# Patient Record
Sex: MALE | Race: OTHER | Employment: FULL TIME | URBAN - METROPOLITAN AREA
[De-identification: names, ages, dates, MRNs, and addresses within clinical notes are randomized per-mention and may not be internally consistent; named-entity substitution may affect disease eponyms.]

---

## 2021-11-09 ENCOUNTER — HOSPITAL ENCOUNTER (OUTPATIENT)
Age: 36
Discharge: HOME | End: 2021-11-09
Payer: COMMERCIAL

## 2021-11-09 VITALS
DIASTOLIC BLOOD PRESSURE: 89 MMHG | SYSTOLIC BLOOD PRESSURE: 158 MMHG | BODY MASS INDEX: 36.96 KG/M2 | WEIGHT: 230 LBS | HEIGHT: 66 IN | RESPIRATION RATE: 18 BRPM | OXYGEN SATURATION: 98 % | HEART RATE: 100 BPM | TEMPERATURE: 99 F

## 2021-11-09 DIAGNOSIS — Z20.822 ENCOUNTER FOR LABORATORY TESTING FOR COVID-19 VIRUS: Primary | ICD-10-CM

## 2021-11-09 PROCEDURE — 99202 OFFICE O/P NEW SF 15 MIN: CPT | Performed by: NURSE PRACTITIONER

## 2021-11-09 PROCEDURE — U0002 COVID-19 LAB TEST NON-CDC: HCPCS | Performed by: NURSE PRACTITIONER

## 2021-11-09 NOTE — ED PROVIDER NOTES
Patient Seen in: [facility]      History   Patient presents with:  Covid-19 Test    Stated Complaint: COVID test    Subjective:   Patient presents to the immediate care, accompanied by self.   Patient reports he is here for Covid test, he had General: He is not in acute distress. Appearance: Normal appearance. He is obese. He is not ill-appearing, toxic-appearing or diaphoretic. HENT:      Head: Normocephalic.       Right Ear: Tympanic membrane and ear canal normal.      Left Ear: Tympanic he is here for Covid test, he had a coworker who tested positive for Covid today. Patient states he was with his coworker on Friday. Patient states he has no symptoms and is fully vaccinated with the Covid vaccine.   Patient is awake, alert, no acute dist

## 2023-08-09 ENCOUNTER — APPOINTMENT (OUTPATIENT)
Dept: GENERAL RADIOLOGY | Age: 38
End: 2023-08-09
Attending: NURSE PRACTITIONER
Payer: COMMERCIAL

## 2023-08-09 ENCOUNTER — HOSPITAL ENCOUNTER (OUTPATIENT)
Age: 38
Discharge: HOME | End: 2023-08-09
Payer: COMMERCIAL

## 2023-08-09 VITALS
BODY MASS INDEX: 36.96 KG/M2 | HEIGHT: 66 IN | SYSTOLIC BLOOD PRESSURE: 146 MMHG | TEMPERATURE: 99 F | DIASTOLIC BLOOD PRESSURE: 96 MMHG | RESPIRATION RATE: 20 BRPM | OXYGEN SATURATION: 98 % | HEART RATE: 100 BPM | WEIGHT: 230 LBS

## 2023-08-09 DIAGNOSIS — M62.838 CERVICAL PARASPINAL MUSCLE SPASM: ICD-10-CM

## 2023-08-09 DIAGNOSIS — R03.0 ELEVATED BLOOD PRESSURE READING: ICD-10-CM

## 2023-08-09 DIAGNOSIS — M54.2 NECK PAIN: Primary | ICD-10-CM

## 2023-08-09 PROCEDURE — 99213 OFFICE O/P EST LOW 20 MIN: CPT | Performed by: NURSE PRACTITIONER

## 2023-08-09 PROCEDURE — 72040 X-RAY EXAM NECK SPINE 2-3 VW: CPT | Performed by: NURSE PRACTITIONER

## 2023-08-09 RX ORDER — CYCLOBENZAPRINE HCL 10 MG
10 TABLET ORAL NIGHTLY
Qty: 5 TABLET | Refills: 0 | Status: SHIPPED | OUTPATIENT
Start: 2023-08-09 | End: 2023-08-14

## 2023-08-09 NOTE — ED INITIAL ASSESSMENT (HPI)
Pt complaining of headache and neck pain started Sunday after walking up the stairs with 3 garbage cans. +dizziness. Pt states sinus pressure for the last week, pt has been taking sudafed, allergy pill, and flonase.

## 2023-08-09 NOTE — DISCHARGE INSTRUCTIONS
Flexeril 1 tablet nightly for the next 5 days  Tylenol 1000 mg every 6 hours as needed  Heating pad to area 10-15 minutes at a time a few times per day  For headache, dizziness recurrence, weakness/numbness/tingling, please go to ER    Your blood pressure is high today. Please check your blood pressure at home 3 times per day, and write it down. Do this for 5 days. Follow up with your doctor in 5 days, and bring the blood pressure log with you for review. For now, avoid high sodium foods. Avoid decongestants like Afrin nasal spray or Sudafed. Blood pressure over 180/100 is concerning, if you have 2 consecutive readings like this, please call your primary care provider or go directly to the emergency room. If you develop chest pain, shortness of breath, dizziness, headache or other unusual symptoms, please go to the emergency room.      Dr. Gualberto Bonilla is pcp in the [facility]

## 2023-08-28 ENCOUNTER — APPOINTMENT (OUTPATIENT)
Dept: URBAN - METROPOLITAN AREA CLINIC 244 | Age: 38
Setting detail: DERMATOLOGY
End: 2023-08-28

## 2023-08-28 DIAGNOSIS — L72.8 OTHER FOLLICULAR CYSTS OF THE SKIN AND SUBCUTANEOUS TISSUE: ICD-10-CM

## 2023-08-28 PROBLEM — D48.5 NEOPLASM OF UNCERTAIN BEHAVIOR OF SKIN: Status: ACTIVE | Noted: 2023-08-28

## 2023-08-28 PROCEDURE — OTHER COUNSELING: OTHER

## 2023-08-28 PROCEDURE — OTHER DEFER: OTHER

## 2023-08-28 PROCEDURE — 99203 OFFICE O/P NEW LOW 30 MIN: CPT

## 2023-08-28 ASSESSMENT — LOCATION ZONE DERM: LOCATION ZONE: NECK

## 2023-08-28 ASSESSMENT — LOCATION SIMPLE DESCRIPTION DERM: LOCATION SIMPLE: POSTERIOR NECK

## 2023-08-28 ASSESSMENT — LOCATION DETAILED DESCRIPTION DERM: LOCATION DETAILED: RIGHT POSTERIOR NECK

## 2023-08-28 NOTE — PROCEDURE: DEFER
Introduction Text (Please End With A Colon): The following procedure was deferred:
Detail Level: Detailed
X Size Of Lesion In Cm (Optional): 0
Procedure To Be Performed At Next Visit: Excision
Instructions (Optional): 20 min appt + 10 min for prep

## 2024-02-28 ENCOUNTER — OFFICE VISIT (OUTPATIENT)
Dept: FAMILY MEDICINE CLINIC | Facility: CLINIC | Age: 39
End: 2024-02-28

## 2024-02-28 VITALS
DIASTOLIC BLOOD PRESSURE: 96 MMHG | WEIGHT: 257.38 LBS | BODY MASS INDEX: 41.37 KG/M2 | HEIGHT: 66 IN | SYSTOLIC BLOOD PRESSURE: 149 MMHG | HEART RATE: 102 BPM

## 2024-02-28 DIAGNOSIS — R03.0 ELEVATED BLOOD PRESSURE READING IN OFFICE WITHOUT DIAGNOSIS OF HYPERTENSION: ICD-10-CM

## 2024-02-28 DIAGNOSIS — M79.672 BILATERAL FOOT PAIN: ICD-10-CM

## 2024-02-28 DIAGNOSIS — I83.90 VARICOSE VEINS: ICD-10-CM

## 2024-02-28 DIAGNOSIS — M79.671 BILATERAL FOOT PAIN: ICD-10-CM

## 2024-02-28 DIAGNOSIS — R06.83 SNORING: ICD-10-CM

## 2024-02-28 DIAGNOSIS — I86.1 VARICOCELE: ICD-10-CM

## 2024-02-28 DIAGNOSIS — Z00.00 WELL ADULT EXAM: Primary | ICD-10-CM

## 2024-02-28 DIAGNOSIS — R53.83 OTHER FATIGUE: ICD-10-CM

## 2024-02-28 DIAGNOSIS — J30.9 ALLERGIC RHINITIS, UNSPECIFIED SEASONALITY, UNSPECIFIED TRIGGER: ICD-10-CM

## 2024-02-28 DIAGNOSIS — E66.01 MORBID OBESITY WITH BMI OF 40.0-44.9, ADULT (HCC): ICD-10-CM

## 2024-02-28 DIAGNOSIS — R04.0 EPISTAXIS: ICD-10-CM

## 2024-02-29 NOTE — PROGRESS NOTES
HPI  Pt here to establish care.   Has some general fatigue.   Has some issues w sinus problems-has not been able to taste or smell since Thanksgiving. Does not think he had covid as he never tested positive  Has long h/o allergy symptoms  Having some pain in right testicle-pulsating pain. 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    Has h/o fracture foot, herniated disc    Family medical history-diabetes; mgf-quadruple bypass, uncle-4 strokes, gm-dm    Has bilat foot pain and swelling-went to podiatry last year-got custom insoles-never fit right.   Has swelling in legs-wears compression hose.     Bp runs 140s/90s at home.   Review of Systems   Constitutional:  Positive for activity change and fatigue.   HENT:  Positive for congestion, nosebleeds, sinus pressure and sinus pain.         Can't smell or taste food since November.    Cardiovascular:  Positive for leg swelling.   Genitourinary:  Positive for testicular pain (right post tender swiggly mass).   Musculoskeletal:  Positive for arthralgias (bialt foot pain R>L) and gait problem.   Psychiatric/Behavioral:  Positive for sleep disturbance (snores alot-always feels tired).        Vitals:    02/28/24 1843 02/28/24 1928   BP: (!) 142/99 (!) 149/96   Pulse: 102    Weight: 257 lb 6.4 oz (116.8 kg)    Height: 5' 6\" (1.676 m)      Body mass index is 41.55 kg/m².  Wt Readings from Last 6 Encounters:   02/28/24 257 lb 6.4 oz (116.8 kg)   08/09/23 230 lb (104.3 kg)   11/09/21 230 lb (104.3 kg)         Health Maintenance   Topic Date Due    Annual Physical  Never done    Pneumococcal Vaccine: Birth to 66yrs (1 of 2 - PCV) Never done    Tobacco Cessation Counseling 1 Year  Never done    DTaP,Tdap,and Td Vaccines (1 - Tdap) Never done    COVID-19 Vaccine (1 - 2023-24 season) Never done     Influenza Vaccine (1) Never done    Annual Depression Screening  Never done       Past Medical History:   Diagnosis Date    Sleep apnea        .No past surgical history on file.    Family History   Problem Relation Age of Onset    Diabetes Mother     Hypertension Mother        Social History     Socioeconomic History    Marital status:      Spouse name: Not on file    Number of children: Not on file    Years of education: Not on file    Highest education level: Not on file   Occupational History    Not on file   Tobacco Use    Smoking status: Every Day     Types: Cigarettes, Cigars    Smokeless tobacco: Not on file   Vaping Use    Vaping Use: Never used   Substance and Sexual Activity    Alcohol use: Yes     Alcohol/week: 1.0 standard drink of alcohol     Types: 1 Cans of beer per week    Drug use: Never    Sexual activity: Not on file   Other Topics Concern    Not on file   Social History Narrative    Not on file     Social Determinants of Health     Financial Resource Strain: Not on file   Food Insecurity: Not on file   Transportation Needs: Not on file   Physical Activity: Not on file   Stress: Not on file   Social Connections: Not on file   Housing Stability: Not on file       No current outpatient medications on file.       Allergies:  No Known Allergies    Physical Exam  Vitals and nursing note reviewed.   Constitutional:       General: He is not in acute distress.     Appearance: He is obese.   HENT:      Head: Normocephalic.      Right Ear: Tympanic membrane, ear canal and external ear normal.      Left Ear: Tympanic membrane, ear canal and external ear normal.      Nose: Congestion and rhinorrhea present.      Right Nostril: Epistaxis present.      Left Nostril: Epistaxis present.      Mouth/Throat:      Mouth: Mucous membranes are moist.      Pharynx: Oropharyngeal exudate present. No posterior oropharyngeal erythema.   Eyes:      Conjunctiva/sclera: Conjunctivae normal.   Cardiovascular:      Rate  and Rhythm: Normal rate and regular rhythm.      Heart sounds: Normal heart sounds.   Pulmonary:      Effort: No respiratory distress.      Breath sounds: Normal breath sounds.   Abdominal:      General: Bowel sounds are normal. There is no distension.      Palpations: Abdomen is soft.   Genitourinary:     Comments: Post tortuous mass noted to right upper post testes  Musculoskeletal:         General: Tenderness (mild int rotation of bilat feet) present.      Cervical back: Normal range of motion and neck supple.   Lymphadenopathy:      Cervical: No cervical adenopathy.   Skin:     General: Skin is dry.   Neurological:      Mental Status: He is alert and oriented to person, place, and time.   Psychiatric:         Mood and Affect: Mood normal.         Behavior: Behavior normal.         Assessment and Plan:   Problem List Items Addressed This Visit       Allergic rhinitis     -otc non-drowsy antihistamine (generic claritin, zyrtec or allegra)  -add steroidal nasal spray (flonase, rhinocort -generic works well)    -supportive care discussed  -Please call if symptoms worsen or are not resolving.   Refer ENT         Relevant Orders    ENT - INTERNAL    Bilateral foot pain     Ear good supportive shoes  Encourage weight loss  Refer podiatry         Relevant Orders    PODIATRY - INTERNAL    Epistaxis     Refer ENT  Treat allergy symptoms as discussed  Use of humidifier at bedside  Neti pot, nasal saline         Relevant Orders    ENT - INTERNAL    Morbid obesity with BMI of 40.0-44.9, adult (HCC)     I recommend that you try/continue to decrease the amount of carbohydrates that you ingest (bread products, rice, pasta, potatoes, starchy vegetables and sugary beverages). Also try to increase the amount of vegetables and protein that you eat daily.  Decrease the amount of processed and fast foods. Try to exercise at least 30 minutes per day, 4-5 times per week, combination of cardio and weight training.            Other  fatigue     Check blood work  Refer sleep study         Relevant Orders    Home Sleep Apnea Test (Adult pt only) - Sleep consult required for Medicare pts    General sleep study    Snoring     Sleep study  Encourage weight loss         Relevant Orders    Home Sleep Apnea Test (Adult pt only) - Sleep consult required for Medicare pts    General sleep study    Varicocele     Refer urology         Relevant Orders    UROLOGY - INTERNAL    Varicose veins     Ultrasound venous insufficiency bilat  Will refer to vascular sx if  needed         Relevant Orders    US VENOUS INSUFFICIENCY (REFLUX) BILAT LOWER EXT SH(CPT=93970)    Well adult exam - Primary     Please aim to eat a diet high in fresh fruits and vegetables, lean protein sources, complex carbohydrates and limited processed and fast foods.  Try to get at least 150 minutes of exercise per week-a combination of weight resistance and cardio is preferred.    Declines vaccines  Screening labs ordered           Relevant Orders    CBC, Platelet; No Differential    Comp Metabolic Panel (14)    Hemoglobin A1C    Lipid Panel    TSH W Reflex To Free T4    Vitamin B12    Vitamin D, 25-Hydroxy               Discussed plan of care with pt and pt is in agreement.All questions answered. Pt to call with questions or concerns.      Encouraged to sign up for My Chart if not already registered.

## 2024-03-04 PROBLEM — R03.0 ELEVATED BLOOD PRESSURE READING IN OFFICE WITHOUT DIAGNOSIS OF HYPERTENSION: Status: ACTIVE | Noted: 2024-03-04

## 2024-03-05 NOTE — ASSESSMENT & PLAN NOTE
Check blood pressure twice per day with arm cuff bp monitor for 2 weeks  Record date, time, blood pressure and pulse reading  Send in copy of bp log to me via bettermarks or you may call into nurse triage staff  Call if bp consistently > 140/86  Go to ER if any severe headache, chest pain, shortness of breath, visual changes  Please let me know if you have any questions or concerns.

## 2024-03-05 NOTE — ASSESSMENT & PLAN NOTE
-otc non-drowsy antihistamine (generic claritin, zyrtec or allegra)  -add steroidal nasal spray (flonase, rhinocort -generic works well)    -supportive care discussed  -Please call if symptoms worsen or are not resolving.   Refer ENT

## 2024-03-05 NOTE — ASSESSMENT & PLAN NOTE
Refer ENT  Treat allergy symptoms as discussed  Use of humidifier at bedside  Neti pot, nasal saline

## 2024-03-05 NOTE — PATIENT INSTRUCTIONS
ALLERGIC RHINITIS    -Take otc allergy medications as directed (over the counter, generic Claritin, Zyrtec, Xyzal or Allegra)    -use of steroidal nasal spray as advised (Flonase, Rhinocort)-this is now available as a generic, over the counter spray if your insurance doesn't cover it     used every morning faithfully each morning during the allergy season is the BEST treatment; they are very safe for use over a period of 6-7 months (April - October)    -over the counter allergy eye drops-Zaditor (ketotifen) 1 drop twice a day works very well for eye symptoms    -keep windows closed at night    -do not allow pets to sleep in room    -use allergen covers on pillows, mattress and box spring to reduce exposure to dust mites    -use of Neti pot    -shower after outdoor activities, especially when allergy counts are high    -Use of allergen filters for furnace; consider air purifier if allergies are significant at night    -Call  If symptoms do not improve, worsen or with other questions or concerns

## 2024-03-05 NOTE — ASSESSMENT & PLAN NOTE
Please aim to eat a diet high in fresh fruits and vegetables, lean protein sources, complex carbohydrates and limited processed and fast foods.  Try to get at least 150 minutes of exercise per week-a combination of weight resistance and cardio is preferred.    Declines vaccines  Screening labs ordered

## 2024-03-05 NOTE — ASSESSMENT & PLAN NOTE
I recommend that you try/continue to decrease the amount of carbohydrates that you ingest (bread products, rice, pasta, potatoes, starchy vegetables and sugary beverages). Also try to increase the amount of vegetables and protein that you eat daily.  Decrease the amount of processed and fast foods. Try to exercise at least 30 minutes per day, 4-5 times per week, combination of cardio and weight training.

## 2024-03-19 DIAGNOSIS — E55.9 VITAMIN D DEFICIENCY: Primary | ICD-10-CM

## 2024-03-19 LAB
ALBUMIN/GLOBULIN RATIO: 1.3 (CALC) (ref 1–2.5)
ALBUMIN: 4.1 G/DL (ref 3.6–5.1)
ALKALINE PHOSPHATASE: 64 U/L (ref 36–130)
ALT: 32 U/L (ref 9–46)
AST: 29 U/L (ref 10–40)
BILIRUBIN, TOTAL: 0.4 MG/DL (ref 0.2–1.2)
BUN: 12 MG/DL (ref 7–25)
CALCIUM: 8.9 MG/DL (ref 8.6–10.3)
CARBON DIOXIDE: 27 MMOL/L (ref 20–32)
CHLORIDE: 104 MMOL/L (ref 98–110)
CHOL/HDLC RATIO: 3.3 (CALC)
CHOLESTEROL, TOTAL: 134 MG/DL
CREATININE: 0.77 MG/DL (ref 0.6–1.26)
EGFR: 118 ML/MIN/1.73M2
GLOBULIN: 3.2 G/DL (CALC) (ref 1.9–3.7)
GLUCOSE: 114 MG/DL (ref 65–99)
HDL CHOLESTEROL: 41 MG/DL
HEMATOCRIT: 44.5 % (ref 38.5–50)
HEMOGLOBIN A1C: 5.4 % OF TOTAL HGB
HEMOGLOBIN: 14.8 G/DL (ref 13.2–17.1)
LDL-CHOLESTEROL: 74 MG/DL (CALC)
MCH: 30.9 PG (ref 27–33)
MCHC: 33.3 G/DL (ref 32–36)
MCV: 92.9 FL (ref 80–100)
MPV: 11.6 FL (ref 7.5–12.5)
NON-HDL CHOLESTEROL: 93 MG/DL (CALC)
PLATELET COUNT: 229 THOUSAND/UL (ref 140–400)
POTASSIUM: 4.2 MMOL/L (ref 3.5–5.3)
PROTEIN, TOTAL: 7.3 G/DL (ref 6.1–8.1)
RDW: 12.5 % (ref 11–15)
RED BLOOD CELL COUNT: 4.79 MILLION/UL (ref 4.2–5.8)
SODIUM: 140 MMOL/L (ref 135–146)
TRIGLYCERIDES: 106 MG/DL
TSH W/REFLEX TO FT4: 1.02 MIU/L (ref 0.4–4.5)
VITAMIN B12: 469 PG/ML (ref 200–1100)
VITAMIN D, 25-OH, TOTAL: 21 NG/ML (ref 30–100)
WHITE BLOOD CELL COUNT: 7 THOUSAND/UL (ref 3.8–10.8)

## 2024-03-19 RX ORDER — CHOLECALCIFEROL (VITAMIN D3) 1250 MCG
50000 CAPSULE ORAL WEEKLY
Qty: 12 CAPSULE | Refills: 3 | Status: SHIPPED | OUTPATIENT
Start: 2024-03-19

## 2024-03-27 ENCOUNTER — PATIENT MESSAGE (OUTPATIENT)
Dept: FAMILY MEDICINE CLINIC | Facility: CLINIC | Age: 39
End: 2024-03-27

## 2024-03-28 NOTE — TELEPHONE ENCOUNTER
_______________________________________________________  Referred to Provider Information:  Provider Address Phone   Brielle Tristan MD [address]  [facility] [address] [phone]       Future Appointments   Date Time Provider Department Center   4/16/2024  3:00 PM [facility] SLEEP ROOMS [facility]EP [facility]

## 2024-04-12 ENCOUNTER — OFFICE VISIT (OUTPATIENT)
Dept: SURGERY | Facility: CLINIC | Age: 39
End: 2024-04-12

## 2024-04-12 DIAGNOSIS — R82.90 URINE FINDING: ICD-10-CM

## 2024-04-12 DIAGNOSIS — N50.811 RIGHT TESTICULAR PAIN: Primary | ICD-10-CM

## 2024-04-12 LAB
APPEARANCE: CLEAR
BILIRUBIN: NEGATIVE
GLUCOSE (URINE DIPSTICK): NEGATIVE MG/DL
LEUKOCYTES: NEGATIVE
MULTISTIX LOT#: NORMAL NUMERIC
NITRITE, URINE: NEGATIVE
OCCULT BLOOD: NEGATIVE
PH, URINE: 5.5 (ref 4.5–8)
PROTEIN (URINE DIPSTICK): NEGATIVE MG/DL
SPECIFIC GRAVITY: 1.02 (ref 1–1.03)
URINE-COLOR: YELLOW
UROBILINOGEN,SEMI-QN: 0.2 MG/DL (ref 0–1.9)

## 2024-04-12 PROCEDURE — 81002 URINALYSIS NONAUTO W/O SCOPE: CPT | Performed by: PHYSICIAN ASSISTANT

## 2024-04-12 PROCEDURE — 99203 OFFICE O/P NEW LOW 30 MIN: CPT | Performed by: PHYSICIAN ASSISTANT

## 2024-04-12 NOTE — PROGRESS NOTES
[facility]    Urology Consult Note    History of Present Illness:   Patient is a 39 year old male with hx of VIVIANA who presents today for consultation from Olivia Chavis's office for testicular pain.    Has been experiencing intermittent right testicular pain that intermittently will extend into the groin and lower abdomen. Not currently taking any pain medications. No urinary complaints. No initial trauma/injury. No swelling noticed by patient. No particular a/a factors.     No FH of  cancers.   No scrotal surgeries.  No hx of radiation of exposure, chemotherapy    HISTORY:  Past Medical History:    Sleep apnea      No past surgical history on file.   Family History   Problem Relation Age of Onset    Diabetes Mother     Hypertension Mother       Social History:   Social History     Socioeconomic History    Marital status:    Tobacco Use    Smoking status: Every Day     Types: Cigarettes, Cigars   Vaping Use    Vaping status: Never Used   Substance and Sexual Activity    Alcohol use: Yes     Alcohol/week: 1.0 standard drink of alcohol     Types: 1 Cans of beer per week    Drug use: Never        Allergies  No Known Allergies    Review of Systems:   A 10-point review of systems was completed and is negative other than as noted above.    Physical Exam:   There were no vitals taken for this visit.    GENERAL APPEARANCE: well developed, well nourished, in no acute distress  NEUROLOGIC: no localizing neurologic signs, alert and oriented x 3, converses appropriately  HEAD: atraumatic, normocephalic  EYES: sclera non-icteric  ORAL CAVITY: mucosa moist  NECK/THYROID: no obvious masses or goiter  LUNGS: non-labored breathing  ABDOMEN: soft, nontender, nondistended  INGUINAL CANALS: no hernias  PENILE MEATUS: open and in normal location, narrow  PENIS normal  SCROTUM: normal  no varicocele  TESTES: normal anatomy  EPIDIDYMIS: normal anatomy mild fullness right, not firm, no  overlying erythema  EXTREMITIES: warm, well-perfused. No clubbing, cyanosis or edema.  SKIN: no obvious rashes    Results:     Laboratory Data:  Lab Results   Component Value Date    WBC 7.0 03/18/2024    HGB 14.8 03/18/2024     03/18/2024     Lab Results   Component Value Date     03/18/2024    K 4.2 03/18/2024     03/18/2024    CO2 27 03/18/2024    BUN 12 03/18/2024     (H) 03/18/2024    AST 29 03/18/2024    ALT 32 03/18/2024    TP 7.3 03/18/2024    ALB 4.1 03/18/2024    CA 8.9 03/18/2024       Urinalysis Results (last three years):  No results for input(s)  in the last 3 years    Urine Culture Results (last three years):  No results found for: \"URINECUL\"    Imaging  No results found.      Impression:     Patient is a 39 year old male with hx of VIVIANA who presents today for consultation from Olivia Chavis's office for testicular pain.    We discussed that he likely has a component of epididymitis. We reviewed epididymitis treatment and prevention strategies and I provided and reviewed educational materials for this. I recommend he drink plenty of fluids and try prn NSAIDs, wear an athletic supporter and try to avoid activities which exacerbate pain such as prolonged sitting or heavy lifting, try hot baths/hot packs. We will defer antibiotic at this time, but if worsening would consider doxycycline BID x 2 weeks.     Recommendations:  US Scrotum. Will need upper tract imaging if solitary right varicocele noted.     Thank you very much for this consult. Please call if there are any questions or concerns.     Laila Escobedo PA-C  Urology  [facility] [place]    Date: 4/12/2024

## 2024-04-12 NOTE — PATIENT INSTRUCTIONS
Epididymitis and Testicular Pain  The epididymis is a small tube next to the testicle that stores sperm. Inflammation of the epididymis can cause pain and swelling in your scrotum. The condition may be acute (sudden onset) or chronic (persisting for a longer period of time or recurrent).   - Acute epididymitis is typically characterized by sudden onset pain, tenderness, swelling and redness.  - Chronic epididymitis is typically characterized by intermittent or long-term dull ache in one or both testicles but the pain can become severe at times.    Causes  - Acute epididymitis is often caused by an infection. In sexually active men, it is often caused by a sexually transmitted disease (STD) such as chlamydia or gonorrhea. In boys and in men over 40, it can be from bacteria from other parts of the urinary tract (not an STD infection). It may also be caused by trauma.  - Chronic epididymitis often may not be associated with an infectious process. Things that may irritate the testicles include sitting for long periods of time, squats or dead-lifts, motorcycles or biking, inflammatory disorders of the pelvis and other painful conditions that can affect the pelvis such as chronic low back pain. Constipation and other bowel-related issues can contribute to chronic testicular pain.    Symptoms may begin with pain in the lower belly (abdomen) or low back. The pain then spreads down into the scrotum. Usually only one side is affected. The testicle and scrotum swell and become very painful and red. You may have fever and a burning when passing urine. Sometimes you may have a discharge from the penis.  Treatment is typically with antibiotics, and anti-inflammatory and pain medicines. The condition should get better over the first few days of treatment. But it will take several weeks for all the swelling and discomfort to go away. If your healthcare provider suspects that an STD is the cause, your sexual partners may need to  be treated.    Home care  The following will help you care for yourself at home:  Support the scrotum. When lying down, place a rolled towel under the scrotum. When walking, use an athletic supporter or 2 pairs of jockey-style underwear.  Rest at home until the fever is gone and you are feeling better.  If your healthcare gives you an antibiotic, take it exactly as you are told. Take it until it is all gone.  Avoid sitting at a 90 degree angle for long periods of time. Standing or reclining is preferable. Avoid sitting on anything that shakes (tractors, lawn-mowers, long car rides) if possible. You may use a donut while sitting to avoid excessive pressure on the testicles while sitting.  To relieve pain, put ice packs on the inflamed area. You can make your own ice pack by putting ice cubes in a sealed plastic bag wrapped in a thin towel.  Soaking in a hot bath or using a heating pad may help alleviated discomfort in men suffering from chronic epididymitis. Would avoid excessive heat in the setting of acute epididymitis.  You may use over-the-counter medicines to control pain, unless another medicine was given. Ibuprofen is typically a very effective anti-inflammatory pain medication. You may take 400 mg twice daily with plenty of water as needed for discomfort. If you have chronic liver or kidney disease, talk with your healthcare provider before taking these medicines. Also talk with your provider if you've ever had a stomach ulcer or GI bleeding.  Make sure chronic pain issues are under good control, especially back pain issues as this is a significant risk factor for chronic testicular/pelvic pain. Talk to your primary care or back specialist if your pain is not under adequate control.  Constipation can make you strain. This makes the pain worse. Avoid constipation by eating natural laxatives such as prunes, fresh fruits, and whole-grain cereals. If necessary, use a mild over-the-counter laxative such as colace  for constipation. Mineral oil can be used to keep the stools soft.

## 2024-04-16 ENCOUNTER — OFFICE VISIT (OUTPATIENT)
Dept: SLEEP CENTER | Age: 39
End: 2024-04-16
Attending: NURSE PRACTITIONER

## 2024-04-16 DIAGNOSIS — R06.83 SNORING: ICD-10-CM

## 2024-04-16 DIAGNOSIS — R53.83 OTHER FATIGUE: ICD-10-CM

## 2024-04-16 PROCEDURE — 95806 SLEEP STUDY UNATT&RESP EFFT: CPT

## 2024-04-18 DIAGNOSIS — G47.33 SEVERE OBSTRUCTIVE SLEEP APNEA: Primary | ICD-10-CM

## 2024-04-18 NOTE — PROCEDURES
[facility]  Accredited by the American Academy of Sleep Medicine (AASM)    PATIENT'S NAME: SABAS INIGUEZ   ATTENDING PHYSICIAN: BALJIT Waterman   REFERRING PHYSICIAN: BALJIT Waterman   PATIENT ACCOUNT #: [id number] LOCATION: Sleep Center   MEDICAL RECORD #: [id number] YOB: 1985   DATE OF STUDY: 04/16/2024       SLEEP STUDY REPORT    STUDY TYPE:  Home sleep test.    INDICATION:  Suspected obstructive sleep apnea (ICD-10 code G47.33) in a patient with snoring, fatigue, witnessed apneic events, night sweats, an [place] Sleepiness Scale score of 13/24, and body mass index 42.    RESULTS:  The patient underwent home sleep test with measurement of his nasal airflow, nasal air pressure, snoring, chest and abdominal wall motion, oximetry, and body position.  I have reviewed the entirety of the raw data of this study.      During the study, the total recording time is 378 minutes.  The lights-out clock time is 12:06 a.m., the lights-on clock time is 6:33 a.m.  The apnea plus hypopnea index is 58.2 events per hour and this olive to 63.5 events per hour in the supine position.  The average oxygen saturation is 93%, the lowest oxygen saturation is 73%, and the patient spent 8% of the test with saturations 88% or less.  The average heart rate is 83 beats per minute, and the patient spent approximately 85% of the test in the supine position.    INTERPRETATION:  The data generated from this study is consistent with very severe obstructive sleep apnea (ICD-10 code G47.33).    RECOMMENDATIONS:    1.   CPAP titration.   2.   Weight loss.   3.   Avoid alcohol.  4.   Avoid sedating drug.    5.   Patient should not drive if at all sleepy.    Please do not hesitate to contact me if there is any question whatsoever regarding interpretation of this study.    Dictated By Sang Cornejo MD  d: 04/17/2024 18:04:11  t: 04/17/2024 18:24:54  [facility] [id number]  PJC/    cc: Olivia Chavis  APRN

## 2024-04-20 ENCOUNTER — HOSPITAL ENCOUNTER (OUTPATIENT)
Dept: ULTRASOUND IMAGING | Age: 39
Discharge: HOME | End: 2024-04-20
Attending: PHYSICIAN ASSISTANT
Payer: COMMERCIAL

## 2024-04-20 DIAGNOSIS — N50.811 RIGHT TESTICULAR PAIN: ICD-10-CM

## 2024-04-20 PROCEDURE — 76870 US EXAM SCROTUM: CPT | Performed by: PHYSICIAN ASSISTANT

## 2024-04-20 PROCEDURE — 93975 VASCULAR STUDY: CPT | Performed by: PHYSICIAN ASSISTANT

## 2024-05-08 ENCOUNTER — OFFICE VISIT (OUTPATIENT)
Dept: SLEEP CENTER | Age: 39
End: 2024-05-08
Attending: NURSE PRACTITIONER
Payer: COMMERCIAL

## 2024-05-08 DIAGNOSIS — G47.33 SEVERE OBSTRUCTIVE SLEEP APNEA: Primary | ICD-10-CM

## 2024-05-08 PROCEDURE — 95811 POLYSOM 6/>YRS CPAP 4/> PARM: CPT

## 2024-05-10 ENCOUNTER — ORDER TRANSCRIPTION (OUTPATIENT)
Dept: SLEEP CENTER | Age: 39
End: 2024-05-10

## 2024-05-10 ENCOUNTER — OFFICE VISIT (OUTPATIENT)
Dept: SURGERY | Facility: CLINIC | Age: 39
End: 2024-05-10

## 2024-05-10 DIAGNOSIS — N50.811 RIGHT TESTICULAR PAIN: Primary | ICD-10-CM

## 2024-05-10 DIAGNOSIS — G47.33 OBSTRUCTIVE SLEEP APNEA (ADULT) (PEDIATRIC): Primary | ICD-10-CM

## 2024-05-10 PROCEDURE — 99213 OFFICE O/P EST LOW 20 MIN: CPT | Performed by: UROLOGY

## 2024-05-10 NOTE — PROGRESS NOTES
Brielle Tristan MD  Department of Urology  [address] [place] [address] [address]  [place] [address]    T: [phone]  F: [phone]    To: None Pcp   [facility] [id number]    Re: Duran Eaton   MRN: [id number]  : 3/9/1985    Dear None Pcp,    Today I had the pleasure of seeing Duran Eaton in my clinic. As you know, Mr. Eaton is a pleasant 39 year old year old male who I am seeing for followup. Patient was last seen in this department on 2024.    Briefly, patient is seen Laila Escobedo on 2024.  At that time he complained of right testicular pain intermittently.  He had an exam that was relatively unremarkable.  Plan was for him to try conservative management and consider ultrasound of the scrotum.    Ultrasound demonstrated a 1.4 cm right epididymal head cyst, 1 cm left epididymal head cyst, bilateral scrotal varicoceles that were mild and no significant hydrocele       PAST MEDICAL HISTORY:  Past Medical History:    Sleep apnea        PAST SURGICAL HISTORY:  No past surgical history on file.      ALLERGIES:  No Known Allergies      MEDICATIONS:  Current Outpatient Medications   Medication Instructions    Vitamin D3 50,000 Units, Oral, Weekly        FAMILY HISTORY:  Family History   Problem Relation Age of Onset    Diabetes Mother     Hypertension Mother         SOCIAL HISTORY:  Social History     Socioeconomic History    Marital status:    Tobacco Use    Smoking status: Every Day     Types: Cigarettes, Cigars   Vaping Use    Vaping status: Never Used   Substance and Sexual Activity    Alcohol use: Yes     Alcohol/week: 1.0 standard drink of alcohol     Types: 1 Cans of beer per week    Drug use: Never          PHYSICAL EXAMINATION:  There were no vitals filed for this visit.  CONSTITUTIONAL: No apparent distress, cooperative and communicative  NEUROLOGIC: Alert and oriented   HEAD: Normocephalic, atraumatic   EYES: Sclera non-icteric   ENT: Hearing intact, moist mucous membranes   NECK: No  obvious goiter or masses   RESPIRATORY: Normal respiratory effort, Nonlabored breathing on room air  SKIN: No evident rashes   ABDOMEN: Soft, nontender, nondistended, no rebound tenderness, no guarding, no masses  GENITOURINARY: Bilateral testicles descended in scrotum without any masses or pain to palpation; no significant varicocele or hydrocele      REVIEW OF SYSTEMS:    A comprehensive 10-point review of systems was completed.  Pertinent positives and negatives are noted in the the HPI.       LABORATORY DATA:      Component  Ref Range & Units 4/12/24 11:13 AM   Glucose Urine  Negative mg/dL Negative   Bilirubin Urine  Negative Negative   Ketones, UA  Negative - Trace mg/dL Trace   Spec Gravity  1.005 - 1.030 1.025   Blood Urine  Negative Negative   PH Urine  5.0 - 8.0 5.5   Protein Urine  Negative - Trace mg/dL Negative   Urobilinogen Urine  0.2 - 1.0 mg/dL 0.2   Nitrite Urine  Negative Negative   Leukocyte Esterase Urine  Negative Negative   APPEARANCE  Clear clear   Color Urine  Yellow yellow   Multistix Lot#  Numeric [id number]   Multistix Expiration Date  Date 5/1/24         Component  Ref Range & Units 3/18/24  8:53 AM   HEMOGLOBIN A1c  <5.7 % of total Hgb 5.4   Comment: For the purpose of screening for the presence of  diabetes:     <5.7%       Consistent with the absence of diabetes  5.7-6.4%    Consistent with increased risk for diabetes              (prediabetes)  > or =6.5%  Consistent with diabetes     This assay result is consistent with a decreased risk  of diabetes.     Currently, no consensus exists regarding use of  hemoglobin A1c for diagnosis of diabetes in children.     According to American Diabetes Association (ADA)  guidelines, hemoglobin A1c <7.0% represents optimal  control in non-pregnant diabetic patients. Different  metrics may apply to specific patient populations.  Standards of Medical Care in Diabetes(ADA).          This test was performed on the Roche tiara c503 platform.  Effective  3/6/24, a change in test platforms from the  Abbott  to the Roche tiara c503 may have shifted  HbA1c results compared to historical results.  Based on laboratory validation testing conducted at  [facility], the Roche platform relative to the Abbott  platform had an average increase in HbA1c value of  < or = 0.3%. This difference is within accepted variability  established by the National Glycohemoglobin  Standardization Program. Note that not all individuals  will have had a shift in their results and direct  comparisons between historical and current results for  testing conducted on different platforms is not         Component  Ref Range & Units 3/18/24  8:53 AM   GLUCOSE  65 - 99 mg/dL 114 High    Comment:               Fasting reference interval     For someone without known diabetes, a glucose value  between 100 and 125 mg/dL is consistent with  prediabetes and should be confirmed with a  follow-up test.      UREA NITROGEN (BUN)  7 - 25 mg/dL 12   CREATININE  0.60 - 1.26 mg/dL 0.77   EGFR  > OR = 60 mL/min/1.73m2 118   BUN/CREATININE RATIO  6 - 22 (calc) SEE NOTE:   Comment:    Not Reported: BUN and Creatinine are within     reference range.        SODIUM  135 - 146 mmol/L 140   POTASSIUM  3.5 - 5.3 mmol/L 4.2   CHLORIDE  98 - 110 mmol/L 104   CARBON DIOXIDE  20 - 32 mmol/L 27   CALCIUM  8.6 - 10.3 mg/dL 8.9   PROTEIN, TOTAL  6.1 - 8.1 g/dL 7.3   ALBUMIN  3.6 - 5.1 g/dL 4.1   GLOBULIN  1.9 - 3.7 g/dL (calc) 3.2   ALBUMIN/GLOBULIN RATIO  1.0 - 2.5 (calc) 1.3   BILIRUBIN, TOTAL  0.2 - 1.2 mg/dL 0.4   ALKALINE PHOSPHATASE  36 - 130 U/L 64   AST  10 - 40 U/L 29   ALT  9 - 46 U/L 32              IMAGING REVIEW:  Narrative   PROCEDURE:  US SCROTUM W/ DOPPLER (CPT=93975/52208)     COMPARISON:  None.     INDICATIONS:  N50.811 Right testicular pain     TECHNIQUE:  Real time grey scale ultrasound was performed of the scrotal contents including the testicles, epididymis, spermatic cord, and scrotal wall. A duplex scan  with B-mode, Doppler color flow, and spectral analysis were also performed.     PATIENT STATED HISTORY: (As transcribed by Technologist)  Patient complains of right scrotal pain.         FINDINGS:       Right testis 5.0 x 3.6 x 2.2 cm.     Left testis 4.4 x 3.4 x 2.2 cm.     Neither testis shows evidence for mass, cyst, abnormal calcification, or abnormal echogenicity.     Appropriate arterial and venous Doppler pattern, no evidence for either hyperemia or torsion of the testis.     Right epididymal head 14 x 10 mm.     Left epididymal head 10 x 9 mm.     Varicocele:  Positive for varicocele bilaterally including on the right side over the region of pain.     Hydrocele: none.     Scrotal skin: within normal limits.                   Impression   CONCLUSION:  Bilateral scrotal varicocele.  No testicular mass or torsion.  Normal appearance of edematous.  No hydrocele.        LOCATION:  [facility]           Dictated by (CST): Gaurav Brown MD on 4/20/2024 at 7:35 PM      Finalized by (CST): Gaurav Brown MD on 4/20/2024 at 7:37 PM         OTHER RELEVANT DATA:   none     IMPRESSION: Testicular pain and scrotal ultrasound demonstrating small epididymal head cyst and mild varicoceles.  Examination is relatively unremarkable.-Recommended continued conservative management and if no improvement in 2 to 3 months return to clinic for reevaluation    Behavioral management includes timed voiding, double voiding, avoiding bladder irritants (coffee, tea, soda, alcohol), sitz baths, meditation 10 minutes every day, constipation avoidance, voiding prior to bedtime, avoiding fluids 2 to 4 hours before bedtime, compression stockings.  Scrotal elevation, scrotal support, ice, NSAIDs.       He should give this 2 to 3 months to have an effect.  If additional benefit is needed can consider tamsulosin 0.4 mg nightly, pelvic floor physical therapy.  Can also consider diagnostic testing with cystoscopy and transrectal ultrasound.  Rectal  suppositories with Valium and baclofen may be helpful in the future.       PLAN:  Conservative management  Return to clinic as needed    Thank you for referring this very pleasant patient to my clinic. If you have any questions or concerns, please do not hesitate to contact me.    Sincerely,  Brielle Tristan MD    30 minutes were spent on this patient at this visit obtaining a history, reviewing medical records, developing a treatment plan, counseling and discussing treatment strategy with patient, coordination of care and documentation.     The 21st Century Cures Act makes medical notes available to patients in the interest of transparency.  However, please be advised that this is a medical document.  It is intended as a peer to peer communication.  It is written in medical language and may contain abbreviations or verbiage that are technical and unfamiliar.  It may appear blunt or direct.  Medical documents are intended to carry relevant information, facts as evident, and the clinical opinion of the practitioner.

## 2024-05-14 DIAGNOSIS — G47.33 OBSTRUCTIVE SLEEP APNEA: Primary | ICD-10-CM

## 2024-05-22 ENCOUNTER — PATIENT MESSAGE (OUTPATIENT)
Dept: FAMILY MEDICINE CLINIC | Facility: CLINIC | Age: 39
End: 2024-05-22

## 2024-05-22 NOTE — TELEPHONE ENCOUNTER
Faxed order to [facility] at [phone], pt notified via [employer].

## 2024-05-22 NOTE — TELEPHONE ENCOUNTER
From: Duran Eaton  To: Olivia Chavis  Sent: 5/22/2024 11:00 AM CDT  Subject: Sleep apnea    David Baker, I still have my heard from the company about the machine. Should I try to reach out to them? I'm just not sure who I would contact

## 2024-05-30 ENCOUNTER — TELEPHONE (OUTPATIENT)
Dept: FAMILY MEDICINE CLINIC | Facility: CLINIC | Age: 39
End: 2024-05-30

## 2024-05-30 DIAGNOSIS — G47.33 OSA (OBSTRUCTIVE SLEEP APNEA): Primary | ICD-10-CM

## 2024-05-30 NOTE — TELEPHONE ENCOUNTER
Order needs correction for the cpap heated humidifier of     Also needs baseline sleep study. faxed    Need order pended with change. Please sign and route back to triage support to send.

## 2024-06-04 ENCOUNTER — PATIENT MESSAGE (OUTPATIENT)
Dept: FAMILY MEDICINE CLINIC | Facility: CLINIC | Age: 39
End: 2024-06-04

## 2024-06-05 NOTE — TELEPHONE ENCOUNTER
Duran,     I just spoke to [facility] ([facility]).     Confirmed all documents required have been received and they will be sending to your insurance.     [facility] will be calling you regarding delivery.     Please follow up at [phone] for further status.     Thank you,  Ilda HURTADO

## 2024-06-18 ENCOUNTER — MED REC SCAN ONLY (OUTPATIENT)
Dept: FAMILY MEDICINE CLINIC | Facility: CLINIC | Age: 39
End: 2024-06-18

## (undated) NOTE — LETTER
Results  General sleep study (Order [id number])  Administration Details     suggestion  The administrations shown are only for this specific order and not for other orders for the same medication that may be in this encounter.     No Administrations Recorded                  Result Information    Status: Final result (Resulted: 4/17/2024  6:04 PM) Provider Status: Reviewed     General sleep study  Order: [id number]   Resulted 4/17/2024  6:04 PM         Status: Final result         Visible to patient: Yes (seen)         Next appt: None         Dx: Snoring; Other fatigue      0 Result Notes         1 Patient Communication            Procedure Note                [facility]  Accredited by the American Academy of Sleep Medicine (AASM)           PATIENT'S NAME: SABAS INIGUEZ   ATTENDING PHYSICIAN: BALJIT Waterman   REFERRING PHYSICIAN: BALJIT Waterman   PATIENT ACCOUNT #: [id number] LOCATION: [facility]   MEDICAL RECORD #: [id number] YOB: 1985   DATE OF STUDY: 04/16/2024          SLEEP STUDY REPORT     STUDY TYPE:  Home sleep test.     INDICATION:  Suspected obstructive sleep apnea (ICD-10 code G47.33) in a patient with snoring, fatigue, witnessed apneic events, night sweats, an Wedron Sleepiness Scale score of 13/24, and body mass index 42.     RESULTS:  The patient underwent home sleep test with measurement of his nasal airflow, nasal air pressure, snoring, chest and abdominal wall motion, oximetry, and body position.  I have reviewed the entirety of the raw data of this study.       During the study, the total recording time is 378 minutes.  The lights-out clock time is 12:06 a.m., the lights-on clock time is 6:33 a.m.  The apnea plus hypopnea index is 58.2 events per hour and this olive to 63.5 events per hour in the supine position.  The average oxygen saturation is 93%, the lowest oxygen saturation is 73%, and the patient spent 8% of the test with saturations 88% or less.   The average heart rate is 83 beats per minute, and the patient spent approximately 85% of the test in the supine position.     INTERPRETATION:  The data generated from this study is consistent with very severe obstructive sleep apnea (ICD-10 code G47.33).     RECOMMENDATIONS:    1.       CPAP titration.   2.       Weight loss.   3.       Avoid alcohol.  4.       Avoid sedating drug.    5.       Patient should not drive if at all sleepy.     Please do not hesitate to contact me if there is any question whatsoever regarding interpretation of this study.     Dictated By Sang Cornejo MD  d:04/17/2024 18:04:11  t:04/17/2024 18:24:54  [id number]  [facility]/     cc:BALJIT Waterman          Last Resulted: 04/17/24  6:04 PM       Order Details          View Encounter          Lab and Collection Details          Routing          Result History       View All Conversations on this Encounter           Result Care Coordination      Patient Communication     Append Comments   Seen Back to Top      You have severe sleep apnea. It is essential that we get you into the sleep center for cpap titration to help resolve your symptoms. An order has been placed-please call for appointment.  PLEASE CALL   [phone]  to schedule an appointment   ...   Written by BALJIT Ochoa on 4/18/2024  3:36 PM CDT View Full Comments  Seen by patient Duran Eaton on 4/18/2024 11:13 PM           Collection Information              Reviewed by Olivia Goff APRN 4/18/2024  3:37 PM       Reviewed By List    Reviewed By Reviewed On   Olivia Chavis APRN 4/18/2024  3:37 PM       Routing History    Priority Sent On From To Message Type    4/18/2024  7:45 AM Interface, Emg Tx In Olivia Chavis APRN Results       View [employer]    General sleep study (Order #[id number]) on 4/16/24       Printable Version (excludes result notes)    General sleep study (Order #[id number]) on 4/16/24          Transcription    Type ID Date and Time Dictating Provider   Sleep Study [id number] 4/17/2024  6:04 PM Sang Cornejo MD   Signed by Sang Cornejo MD on 04/18/24 at 0950               [facility]  Accredited by the American Academy of Sleep Medicine (AASM)           PATIENT'S NAME: SABAS INIGUEZ   ATTENDING PHYSICIAN: BALJIT Waterman   REFERRING PHYSICIAN: BALJIT Waterman   PATIENT ACCOUNT #: [id number] LOCATION: [facility]   MEDICAL RECORD #: [id number] YOB: 1985   DATE OF STUDY: 04/16/2024          SLEEP STUDY REPORT     STUDY TYPE:  Home sleep test.     INDICATION:  Suspected obstructive sleep apnea (ICD-10 code G47.33) in a patient with snoring, fatigue, witnessed apneic events, night sweats, an Temple Sleepiness Scale score of 13/24, and body mass index 42.     RESULTS:  The patient underwent home sleep test with measurement of his nasal airflow, nasal air pressure, snoring, chest and abdominal wall motion, oximetry, and body position.  I have reviewed the entirety of the raw data of this study.       During the study, the total recording time is 378 minutes.  The lights-out clock time is 12:06 a.m., the lights-on clock time is 6:33 a.m.  The apnea plus hypopnea index is 58.2 events per hour and this olive to 63.5 events per hour in the supine position.  The average oxygen saturation is 93%, the lowest oxygen saturation is 73%, and the patient spent 8% of the test with saturations 88% or less.  The average heart rate is 83 beats per minute, and the patient spent approximately 85% of the test in the supine position.     INTERPRETATION:  The data generated from this study is consistent with very severe obstructive sleep apnea (ICD-10 code G47.33).     RECOMMENDATIONS:    1.       CPAP titration.   2.       Weight loss.   3.       Avoid alcohol.  4.       Avoid sedating drug.    5.       Patient should not drive if at all sleepy.     Please do not hesitate to  contact me if there is any question whatsoever regarding interpretation of this study.     Dictated By Sang Cornejo MD  d:04/17/2024 18:04:11  t:04/17/2024 18:24:54  [id number]  [facility]/     cc:BALJIT Waterman               Display only: Transcription ([id number]) on 4/17/2024  6:04 PM by Sang Cornejo MD   Document history: Transcription ([id number]) on 4/17/2024  6:04 PM by Sang Cornejo MD         MyChart Results Release    MyChart Status: Active  Results Release